# Patient Record
Sex: FEMALE | HISPANIC OR LATINO
[De-identification: names, ages, dates, MRNs, and addresses within clinical notes are randomized per-mention and may not be internally consistent; named-entity substitution may affect disease eponyms.]

---

## 2024-03-05 PROBLEM — 70342003: Status: ACTIVE | Noted: 2024-03-05

## 2024-03-05 PROBLEM — 397881000: Status: ACTIVE | Noted: 2024-03-05

## 2024-08-06 ENCOUNTER — DASHBOARD ENCOUNTERS (OUTPATIENT)
Age: 49
End: 2024-08-06

## 2024-08-06 ENCOUNTER — OFFICE VISIT (OUTPATIENT)
Dept: URBAN - METROPOLITAN AREA CLINIC 82 | Facility: CLINIC | Age: 49
End: 2024-08-06
Payer: COMMERCIAL

## 2024-08-06 VITALS
TEMPERATURE: 98 F | BODY MASS INDEX: 31.24 KG/M2 | HEIGHT: 55 IN | HEART RATE: 67 BPM | WEIGHT: 135 LBS | SYSTOLIC BLOOD PRESSURE: 107 MMHG | DIASTOLIC BLOOD PRESSURE: 69 MMHG

## 2024-08-06 DIAGNOSIS — K80.20 CALCULUS OF GALLBLADDER WITHOUT CHOLECYSTITIS WITHOUT OBSTRUCTION: ICD-10-CM

## 2024-08-06 PROCEDURE — 99213 OFFICE O/P EST LOW 20 MIN: CPT | Performed by: STUDENT IN AN ORGANIZED HEALTH CARE EDUCATION/TRAINING PROGRAM

## 2024-08-06 NOTE — HPI-TODAY'S VISIT:
49 y.o Lebanese-speaking female presents today for c/o gallstones. Pt had RUQ US completed around 03/2024 that noted extensive gallstones. She waw sent to see GS, however, visit was not covered by insurance so patient is back here for another recommendation. She is currently reporting of postprandial epigastric pain after eating certain greasy meals or products containing eggs. She has no NVF. Wt stable. Has no other concerns.